# Patient Record
(demographics unavailable — no encounter records)

---

## 2017-07-01 NOTE — EMERGENCY ROOM REPORT
History of Present Illness


General


Chief Complaint:  Chest Pain


Source:  Patient





Present Illness


HPI


23 [facility]  came into ED with chest pain for 1 hour.  Pain 

worse with movement.


Substernal, central, non-radiating.  No assoc SOB, nausea/vomiting, fever/

chills.  No history of asthma.  Non-smoker.


No recent URI symptoms


No self or family history of PE


Took ibuprofen 30 min ago which helped


Allergies:  


Coded Allergies:  


     No Known Allergies (Unverified , 7/1/17)





Patient History


Past Medical History:  none


Past Surgical History:  none


Pertinent Family History:  none


Social History:  Denies: alcohol use, drug use, smoking


Immunizations:  UTD


Reviewed Nursing Documentation:  PMH: Agreed, PSxH: Agreed





Nursing Documentation-PMH


Past Medical History:  No History, Except For





Review of Systems


All Other Systems:  negative except mentioned in HPI





Physical Exam





Vital Signs








  Date Time  Temp Pulse Resp B/P Pulse Ox O2 Delivery O2 Flow Rate FiO2


 


7/1/17 12:30 97.5 90 16 129/70 100 Room Air  








Sp02 EP Interpretation:  reviewed, normal


General Appearance:  normal inspection, well appearing, no apparent distress, 

alert, GCS 15, non-toxic


Head:  normocephalic, atraumatic


Eyes:  bilateral eye EOMI, bilateral eye PERRL


ENT:  normal ENT inspection, hearing grossly normal, normal voice


Neck:  normal inspection, full range of motion, supple, no bony tend


Respiratory:  normal inspection, lungs clear, normal breath sounds, no 

respiratory distress, no retraction, no wheezing, other - CP reproducible over 

sternum, chest symmetrical


Cardiovascular #1:  regular rate, rhythm, no edema


Gastrointestinal:  normal inspection, normal bowel sounds, non tender, soft, no 

guarding, no hernia


Genitourinary:  no CVA tenderness


Musculoskeletal:  normal inspection, back normal, normal range of motion, Marge'

s Sign negative


Neurologic:  normal inspection, alert, oriented x3, responsive, CNs III-XII nml 

as tested, motor strength/tone normal, speech normal


Psychiatric:  normal inspection, judgement/insight normal, mood/affect normal


Skin:  normal inspection, normal color, no rash


Lymphatic:  normal inspection





Medical Decision Making


Diagnostic Impression:  


 Primary Impression:  


 Chest pain


 Qualified Codes:  R07.9 - Chest pain, unspecified


ER Course


Chest pain


- VSS. Afebrile.


- Very well appearing


- ECG: no ischemia.  No nargis/myocarditis pattern.  No S1Q3T3 or RBBB or sinus 

tach to suggest PE


- Low suspicion for ACS given no CAD risk factors


- No asthma, smoking history


- Likely MSK.  +reproducible, worse with movement.  Improved with NSAID


-DC home


EKG Diagnostic Results


Rate:  normal


Rhythm:  NSR


ST Segments:  no acute changes


ASA given to the pt in ED:  No





Last Vital Signs








  Date Time  Temp Pulse Resp B/P Pulse Ox O2 Delivery O2 Flow Rate FiO2


 


7/1/17 12:47 97.4 87 15 125/68 100 Room Air  








Status:  improved


Disposition:  HOME, SELF-CARE


Condition:  Improved


Scripts


Ibuprofen* (MOTRIN*) 600 Mg Tablet


600 MG ORAL THREE TIMES A DAY for 7 Days, #30 TAB 0 Refills


   Prov: LEXA CLARKE M.D.         7/1/17


Patient Instructions:  Nonspecific Chest Pain











LEXA CLARKE M.D. Jul 1, 2017 13:07

## 2017-07-02 NOTE — CARDIOLOGY REPORT
--------------- APPROVED REPORT --------------





EKG Measurement

Heart [id number]

MA [id number]

[id number]

[id number]

[id number]





Normal sinus rhythm

Normal ECG
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
## 2017-11-26 NOTE — DIAGNOSTIC IMAGING REPORT
Indication: Left wrist pain



Technique: XRAY WRIST MIN 3V LEFT



Comparison: None



Findings: There is no radiographically evident fracture or dislocation. 

Bone

mineralization is normal. Soft tissues are grossly unremarkable.



Impression:



No radiographically evident fracture or dislocation. If there is clinical concern

for radiographically occult scaphoid injury, followup/further evaluation

recommended.